# Patient Record
Sex: FEMALE | Race: WHITE | NOT HISPANIC OR LATINO | Employment: PART TIME
[De-identification: names, ages, dates, MRNs, and addresses within clinical notes are randomized per-mention and may not be internally consistent; named-entity substitution may affect disease eponyms.]

---

## 2021-08-31 ENCOUNTER — PRE VISIT (OUTPATIENT)
Dept: OTHER | Age: 66
End: 2021-08-31

## 2021-08-31 ENCOUNTER — TRANSCRIBE ORDERS (OUTPATIENT)
Dept: OTHER | Age: 66
End: 2021-08-31

## 2021-08-31 DIAGNOSIS — K44.9 HIATAL HERNIA: Primary | ICD-10-CM

## 2021-09-09 DIAGNOSIS — K44.9 HIATAL HERNIA: Primary | ICD-10-CM

## 2021-09-14 NOTE — PROGRESS NOTES
THORACIC SURGERY - NEW PATIENT OFFICE VISIT     Dear Dr. Thomas,    I saw Noa Mary at Dr. Thompson  request in consultation for the evaluation and treatment of a large hiatal hernia   HPI  Helena Mary is a 66-year-old woman who presents to clinic for discussion of possible hiatal hernia repair. The patient had a known hiatal hernia for many years (noted as early as 2012 on EGD). In 2018, she had a CT scan of the chest which demonstrated a very large hiatal hernia with most of her stomach in the thoracic cavity.   Her symptoms have started to worsen since the beginning of the year. She presented to  (general surgery) with a history of early satiety, upper abdominal pain, nausea, regurgitation, chronic cough with exacerbation while supine at night, back pain, right upper quadrant abdominal discomfort, sensation of inability to burp intermittently, and occasional heartburn (under control with PPI). She continues to have these intermittent symptoms that appear to be worse with certain foods such as potato chips and ice cream.    She denies dysphagia  Of note, she had an abdominal ultrasound in March of this year showing cholelithiasis.   She has some trouble with walking and requires her inhaler after.    Previsit Tests   CT Chest 4/17/2018: giant hiatal hernia with entire stomach in thorax      PMH  - Anxiety/depression  - Celiac disease  - GERD  - Female stress incontinence  - DARI  - Osteoporosis  - Rosacea  - Esophageal stricture and stenosis  - Essential hypertension  - Hypothyroidism     PSH  - Colonoscopy (with poylp removal 9/24/14, 2/11/08)  - EGD with biposy (11/27/13, 5/9/12, 7/9/10, 6/11/07, 7/5/04)  - D&C: dysfunctional uterine bleeding (2/4/99)  - LASHA AND BSO with right oopherectomy (4/8/99)  - Pelvic laparoscopy due to right tubal ectopic pregnancy & appendectomy (5/17/94)  - Tubal ligation (6/10/86)    ETOH none  TOB former- quit 13 yrs ago, smoked for 20 years, up to 2 packs     Physical  examination  BP (!) 159/84 (BP Location: Right arm, Patient Position: Sitting, Cuff Size: Adult Regular)   Pulse 57   Temp 98.1  F (36.7  C) (Oral)   Wt 77.4 kg (170 lb 9.6 oz)   SpO2 97%   BMI - no height listed so unable to calculate      From a personal perspective, she works as a .     IMPRESSION    66 year old year-old female with  with a hiatal hernia.    PLAN  I spent 45 min on the date of the encounter in chart review, patient visit, review of tests, documentation and/or discussion with other providers about the issues documented above. I reviewed the plan as follows:  Procedure planned: Robotic/ Laparoscopic hiatal hernia repair, possible Nissen fundoplication, gastrostomy tube. I reviewed the indications, risks, and benefits of the procedure with Ms. Helena Mary .  We discussed the risks that include but are not limited to bleeding, infection, need for reoperation, conversion to laparotomy, potential long-term failure, and death. I explained the anticipated hospital course (2-5 days) and postoperative recovery, transient dysphagia, transient diarrhea, and permanent postprandial bloating which can be mitigated with proper dietary habits. We discussed the importance of lifetime awareness to limit lifting heavy weights in case of identifying a hiatal hernia.  .We discussed that we couldn't schedule her now due to scheduling issues with the staffing shortage  Consent: pending   Necessary Preop Tests & Appointments:   PAC  Regional Anesthesia Plan: locsl at port sites       All questions were answered and Helena Mary and present family were in agreement with the plan.  I appreciate the opportunity to participate in the care of your patient and will keep you updated.  Sincerely,

## 2021-09-14 NOTE — PROGRESS NOTES
THORACIC SURGERY - NEW PATIENT OFFICE VISIT     Dear Dr. Karmen Thomas,    I saw Noa Mary at Dr. Jay bertrand in consultation for the evaluation and treatment of a ***.     HPI  Helena Mary is a 66-year-old woman with a history of early satiety, upper abdominal pain, nausea, regurgitation, chronic cough with exacerbation while supine at night, back pain, right upper quadrant abdominal discomfort, sensation of inability to burp intermittently, and occasional heartburn (under control with PPI). The patient had a known hiatal hernia seen on EGD in 2012. In 2018, she had a CT scan of the chest which demonstrated a very large hiatal hernia showing most of her stomach in the chest. She had an abdominal ultrasound in March of this year showing cholelithiasis. She continues to have intermittent symptoms that appear to be worse with certain foods such as potato chips and ice cream. She was referred to me to discuss possible cholecystectomy. She has no other complaints                                     Previsit Tests   ***  PMH  ***    No past medical history on file.     PSH  ***    No past surgical history on file.     ETOH***  TOB***    Physical examination  BMI ***  ***    From a personal perspective, ***    IMPRESSION No diagnosis found.   66 year old year-old female with ***    PLAN  I spent *** min on the date of the encounter in chart review, patient visit, review of tests, documentation and/or discussion with other providers about the issues documented above. I reviewed the plan as follows:  Procedure planned: ***.  Consent: ***  Necessary Preop Tests & Appointments: ***  Regional Anesthesia Plan: ***  Anticoagulation Plan: ***  Smoking Cessation: ***  Ms. Mary was counseled on the importance of smoking cessation and its impact on the ginny-operative course. {[id number]} This guideline is in accordance with the World Health Organization (WHO) and has shown to lower the risk of both surgical and  anesthesia complications as well as improve post-operative outcomes.     All questions were answered and Helena Mary and present family were in agreement with the plan.  I appreciate the opportunity to participate in the care of your patient and will keep you updated.  Sincerely,

## 2021-09-16 ENCOUNTER — OFFICE VISIT (OUTPATIENT)
Dept: SURGERY | Facility: CLINIC | Age: 66
End: 2021-09-16
Attending: SURGERY
Payer: MEDICARE

## 2021-09-16 VITALS
SYSTOLIC BLOOD PRESSURE: 159 MMHG | HEART RATE: 57 BPM | TEMPERATURE: 98.1 F | WEIGHT: 170.6 LBS | DIASTOLIC BLOOD PRESSURE: 84 MMHG | OXYGEN SATURATION: 97 %

## 2021-09-16 DIAGNOSIS — K44.9 HIATAL HERNIA: ICD-10-CM

## 2021-09-16 PROCEDURE — G0463 HOSPITAL OUTPT CLINIC VISIT: HCPCS

## 2021-09-16 PROCEDURE — 99204 OFFICE O/P NEW MOD 45 MIN: CPT | Performed by: STUDENT IN AN ORGANIZED HEALTH CARE EDUCATION/TRAINING PROGRAM

## 2021-09-16 RX ORDER — LISINOPRIL 20 MG/1
20 TABLET ORAL
COMMUNITY
Start: 2021-04-26

## 2021-09-16 RX ORDER — LEVOTHYROXINE SODIUM 75 UG/1
TABLET ORAL
COMMUNITY
Start: 2021-08-17

## 2021-09-16 RX ORDER — HYDROCODONE BITARTRATE AND ACETAMINOPHEN 5; 325 MG/1; MG/1
TABLET ORAL
COMMUNITY

## 2021-09-16 RX ORDER — ALBUTEROL SULFATE 0.83 MG/ML
SOLUTION RESPIRATORY (INHALATION)
COMMUNITY

## 2021-09-16 RX ORDER — DESONIDE 0.5 MG/G
CREAM TOPICAL
COMMUNITY

## 2021-09-16 RX ORDER — CITALOPRAM HYDROBROMIDE 20 MG/1
TABLET ORAL
COMMUNITY
Start: 2021-05-29

## 2021-09-16 RX ORDER — VENLAFAXINE 75 MG/1
TABLET ORAL
COMMUNITY

## 2021-09-16 RX ORDER — ESTRADIOL 1 MG/1
TABLET ORAL
COMMUNITY
Start: 2020-06-02

## 2021-09-16 RX ORDER — INHALER, ASSIST DEVICES
SPACER (EA) MISCELLANEOUS
COMMUNITY
Start: 2020-10-14

## 2021-09-16 RX ORDER — HYDROCHLOROTHIAZIDE 25 MG/1
TABLET ORAL
COMMUNITY

## 2021-09-16 RX ORDER — ACETAMINOPHEN AND CODEINE PHOSPHATE 300; 30 MG/1; MG/1
TABLET ORAL
COMMUNITY

## 2021-09-16 RX ORDER — TRAZODONE HYDROCHLORIDE 50 MG/1
50 TABLET, FILM COATED ORAL
COMMUNITY
Start: 2021-08-03

## 2021-09-16 RX ORDER — PREDNISONE 20 MG/1
TABLET ORAL
COMMUNITY

## 2021-09-16 RX ORDER — LORAZEPAM 0.5 MG/1
TABLET ORAL
COMMUNITY
Start: 2021-08-03

## 2021-09-16 RX ORDER — MONTELUKAST SODIUM 10 MG/1
10 TABLET ORAL
COMMUNITY
Start: 2021-08-17

## 2021-09-16 RX ORDER — TRIAMCINOLONE ACETONIDE 1 MG/G
CREAM TOPICAL
COMMUNITY

## 2021-09-16 RX ORDER — AZITHROMYCIN 250 MG/1
TABLET, FILM COATED ORAL
COMMUNITY

## 2021-09-16 RX ORDER — POTASSIUM CHLORIDE 1500 MG/1
TABLET, EXTENDED RELEASE ORAL
COMMUNITY

## 2021-09-16 RX ORDER — CODEINE PHOSPHATE AND GUAIFENESIN 10; 100 MG/5ML; MG/5ML
SOLUTION ORAL
COMMUNITY

## 2021-09-16 RX ORDER — FOLIC ACID 1 MG/1
TABLET ORAL EVERY 24 HOURS
COMMUNITY

## 2021-09-16 ASSESSMENT — PAIN SCALES - GENERAL: PAINLEVEL: NO PAIN (0)

## 2021-09-16 NOTE — NURSING NOTE
Oncology Rooming Note    September 16, 2021 10:03 AM   Helena Mary is a 66 year old female who presents for:    Chief Complaint   Patient presents with     Oncology Clinic Visit     Hiatal hernia      Initial Vitals: BP (!) 159/84 (BP Location: Right arm, Patient Position: Sitting, Cuff Size: Adult Regular)   Pulse 57   Temp 98.1  F (36.7  C) (Oral)   Wt 77.4 kg (170 lb 9.6 oz)   SpO2 97%  There is no height or weight on file to calculate BMI. There is no height or weight on file to calculate BSA.  No Pain (0) Comment: Data Unavailable   No LMP recorded.  Allergies reviewed: Yes  Medications reviewed: Yes    Medications: Medication refills not needed today.  Pharmacy name entered into EPIC: Data Unavailable    Clinical concerns: New patient.        Rafaela Blunt LPN September 16, 2021 10:03 AM

## 2021-09-17 ENCOUNTER — PREP FOR PROCEDURE (OUTPATIENT)
Dept: SURGERY | Facility: CLINIC | Age: 66
End: 2021-09-17

## 2021-09-17 DIAGNOSIS — K44.9 HIATAL HERNIA: Primary | ICD-10-CM

## 2021-09-17 RX ORDER — CEFAZOLIN SODIUM 2 G/50ML
2 SOLUTION INTRAVENOUS
Status: CANCELLED | OUTPATIENT
Start: 2021-09-17

## 2021-09-17 RX ORDER — CEFAZOLIN SODIUM 2 G/50ML
2 SOLUTION INTRAVENOUS SEE ADMIN INSTRUCTIONS
Status: CANCELLED | OUTPATIENT
Start: 2021-09-17

## 2021-09-22 DIAGNOSIS — K44.9 HIATAL HERNIA: Primary | ICD-10-CM

## 2021-10-17 ENCOUNTER — HEALTH MAINTENANCE LETTER (OUTPATIENT)
Age: 66
End: 2021-10-17

## 2022-02-18 ENCOUNTER — TELEPHONE (OUTPATIENT)
Dept: SURGERY | Facility: CLINIC | Age: 67
End: 2022-02-18
Payer: MEDICARE

## 2022-02-18 NOTE — TELEPHONE ENCOUNTER
"Call to Noa to see if she would like to pursue surgery. She is not ready right now-she was recently in the hospital a month ago for Covid related illness and is \"not even thinking about surgery right now.\"    I offered to call her back in a few months and she said she would really appreciate that.  "

## 2022-10-03 ENCOUNTER — HEALTH MAINTENANCE LETTER (OUTPATIENT)
Age: 67
End: 2022-10-03

## 2023-02-11 ENCOUNTER — HEALTH MAINTENANCE LETTER (OUTPATIENT)
Age: 68
End: 2023-02-11

## 2023-12-30 ENCOUNTER — HEALTH MAINTENANCE LETTER (OUTPATIENT)
Age: 68
End: 2023-12-30

## 2024-03-09 ENCOUNTER — HEALTH MAINTENANCE LETTER (OUTPATIENT)
Age: 69
End: 2024-03-09